# Patient Record
Sex: FEMALE | Race: WHITE | URBAN - METROPOLITAN AREA
[De-identification: names, ages, dates, MRNs, and addresses within clinical notes are randomized per-mention and may not be internally consistent; named-entity substitution may affect disease eponyms.]

---

## 2021-02-17 ENCOUNTER — TELEMEDICINE CONVERTED (OUTPATIENT)
Dept: GASTROENTEROLOGY | Facility: CLINIC | Age: 26
End: 2021-02-17
Attending: NURSE PRACTITIONER

## 2021-05-10 NOTE — H&P
History and Physical      Patient Name: Deanna Friend   Patient ID: [id number]   Sex: Female   YOB: 1995    Primary Care Provider: Christina HERNANDEZ   Referring Provider: Katja Humphrey MD    Visit Date: February 17, 2021    Provider: DAVID Haji   Location: [facility]   Location Address: [address] [place] [address], [address]  [place], [place]  [id number]   Location Phone: [phone]          Chief Complaint  · Abdominal pain  · Nausea      History Of Present Illness  Video Conferencing Visit  Deanna Friend is a 25 year old female who is presenting for evaluation via video conferencing via ZOOM. Verbal consent obtained before beginning visit.   The following staff were present during this visit: Christal Vo; Shane Dorsey   The patient is a 25 year old female who presents on referral from Katja Humphrey MD for a gastroenterology evaluation.      Ms. Friend presents today for generalized abdominal pain on and off for several years now. She felt that the pain has worsened after cholecystectomy 3 years ago. Milk does also intensify the pain so she avoids it. However feels that she is unable to pinpoint all foods that cause discomfort as she feels uncomfortable after most meals. Antacids do help decrease the pain. Nauseated each morning without vomiting. Denies heartburn, dysphagia change in appetite, or weight loss.    Having a bowel movement once daily, formed stool. Denies any hematochezia, melena, or family hox of colon cancer.            Past Surgical History  Cholecystectomy         Medication List  fluoxetine 20 mg oral capsule; hydralazine 10 mg oral tablet; venlafaxine 75 mg oral capsule,extended release 24hr         Allergy List  NO KNOWN DRUG ALLERGIES       Allergies Reconciled  Family Medical History  No family history of colorectal cancer         Social History  Tobacco (Current every day)         Review of Systems  · Constitutional  o Denies  o :  chills, fever  · Eyes  o Denies  o : blurred vision, changes in vision  · Cardiovascular  o Denies  o : chest pain, syncope  · Respiratory  o Denies  o : shortness of breath, dry cough  · Gastrointestinal  o Admits  o : See HPI  · Genitourinary  o Denies  o : dysuria, blood in urine  · Integument  o Denies  o : rash, new skin lesions  · Neurologic  o Denies  o : altered mental status, tingling or numbness  · Musculoskeletal  o Denies  o : joint pain, limitation of motion  · Endocrine  o Denies  o : weight gain, weight loss  · Psychiatric  o Denies  o : anxiety, depression      Physical Examination  · Constitutional  o Appearance  o : well developed, well-nourished, in no acute distress  · Respiratory  o Respiratory Effort  o : breathing unlabored  · Psychiatric  o General  o : Alert and oriented x3  o Mood and Affect  o : Mood and affect are appropriate to circumstances          Assessment  · Generalized abdominal pain     789.07/R10.84  · Nausea alone     787.02/R11.0  · Food sensitivity with gastrointestinal symptoms       Other adverse food reactions, not elsewhere classified, initial encounter     693.1/T78.1XXA      Plan  · Orders  o CBC with Auto Diff [facility] (02183) - - 02/17/2021  o CMP [facility] (06528) - - 02/17/2021  o CT abdomen and pelvis with contrast (34224) - - 02/17/2021  o Food Profile 19 Immunocap [facility] (23807) - - 02/17/2021  o Celiac panel adult (77510, 54033) - - 02/17/2021  · Medications  o Pepcid 40 mg oral tablet   SIG: take 1 tablet by oral route daily for 30 days   DISP: (30) Tablet with 3 refills  Prescribed on 02/17/2021     o Florajen3 460 mg (7.5-6- 1.5 bill. cell) oral capsule   SIG: take 1 capsule by oral route daily for 30 days   DISP: (30) Capsule with 3 refills  Prescribed on 02/17/2021     o Medications have been Reconciled  · Instructions  o Information given on current diagnoses.  o Lifestyle modifications discussed.  o Consider EGD if no etiology of symptoms   o Electronically Identified  Patient Education Materials Provided Electronically  · Disposition  o 2 month f/u            Electronically Signed by: DAVID Haji -Author on February 17, 2021 01:40:29 PM

## 2021-05-14 VITALS — WEIGHT: 180 LBS | BODY MASS INDEX: 31.89 KG/M2 | HEIGHT: 63 IN

## 2021-09-07 ENCOUNTER — TELEPHONE (OUTPATIENT)
Dept: GASTROENTEROLOGY | Facility: CLINIC | Age: 26
End: 2021-09-07

## 2022-05-06 ENCOUNTER — TELEPHONE (OUTPATIENT)
Dept: GASTROENTEROLOGY | Facility: CLINIC | Age: 27
End: 2022-05-06

## 2022-05-06 NOTE — TELEPHONE ENCOUNTER
Patient called and left a voicemail stating that her insurance needs more information to do a CT scan that was ordered for her on 3/16/2021. Left a message requesting a call back.

## 2022-05-09 NOTE — TELEPHONE ENCOUNTER
Patient states that her EOB had a bill states that our office needs to submit supporting information through [employer] for a CT scan on 03/16/21 performed at [facility].   Claim # [id number].